# Patient Record
Sex: FEMALE | Race: WHITE | NOT HISPANIC OR LATINO | Employment: FULL TIME | URBAN - METROPOLITAN AREA
[De-identification: names, ages, dates, MRNs, and addresses within clinical notes are randomized per-mention and may not be internally consistent; named-entity substitution may affect disease eponyms.]

---

## 2017-07-25 DIAGNOSIS — Z13.220 LIPID SCREENING: Primary | ICD-10-CM

## 2017-07-25 DIAGNOSIS — Z13.21 ENCOUNTER FOR VITAMIN DEFICIENCY SCREENING: ICD-10-CM

## 2017-07-25 DIAGNOSIS — R63.5 ABNORMAL WEIGHT GAIN: ICD-10-CM

## 2017-07-28 ENCOUNTER — OFFICE VISIT (OUTPATIENT)
Dept: FAMILY MEDICINE CLINIC | Facility: CLINIC | Age: 32
End: 2017-07-28

## 2017-07-28 VITALS
OXYGEN SATURATION: 98 % | WEIGHT: 258 LBS | HEIGHT: 63 IN | BODY MASS INDEX: 45.71 KG/M2 | RESPIRATION RATE: 16 BRPM | TEMPERATURE: 98.6 F | DIASTOLIC BLOOD PRESSURE: 60 MMHG | HEART RATE: 72 BPM | SYSTOLIC BLOOD PRESSURE: 110 MMHG

## 2017-07-28 DIAGNOSIS — Z79.899 HIGH RISK MEDICATION USE: ICD-10-CM

## 2017-07-28 DIAGNOSIS — E66.01 MORBID OBESITY DUE TO EXCESS CALORIES (HCC): Primary | ICD-10-CM

## 2017-07-28 DIAGNOSIS — M54.50 ACUTE BILATERAL LOW BACK PAIN WITHOUT SCIATICA: ICD-10-CM

## 2017-07-28 PROBLEM — F41.9 ANXIETY: Status: ACTIVE | Noted: 2017-07-28

## 2017-07-28 PROCEDURE — 99203 OFFICE O/P NEW LOW 30 MIN: CPT | Performed by: NURSE PRACTITIONER

## 2017-07-28 PROCEDURE — 93000 ELECTROCARDIOGRAM COMPLETE: CPT | Performed by: NURSE PRACTITIONER

## 2017-07-28 RX ORDER — PHENTERMINE HYDROCHLORIDE 37.5 MG/1
37.5 CAPSULE ORAL EVERY MORNING
Qty: 30 CAPSULE | Refills: 0 | Status: SHIPPED | OUTPATIENT
Start: 2017-07-28 | End: 2017-11-20 | Stop reason: ALTCHOICE

## 2017-07-28 RX ORDER — CITALOPRAM 40 MG/1
40 TABLET ORAL DAILY
COMMUNITY
End: 2017-07-28 | Stop reason: ALTCHOICE

## 2017-07-28 RX ORDER — METHYLPREDNISOLONE 4 MG/1
TABLET ORAL
Qty: 21 TABLET | Refills: 0 | Status: SHIPPED | OUTPATIENT
Start: 2017-07-28 | End: 2017-08-17 | Stop reason: SDUPTHER

## 2017-07-28 RX ORDER — METAXALONE 800 MG/1
800 TABLET ORAL 3 TIMES DAILY PRN
Qty: 30 TABLET | Refills: 1 | Status: SHIPPED | OUTPATIENT
Start: 2017-07-28 | End: 2017-11-20

## 2017-07-28 RX ORDER — CITALOPRAM 40 MG/1
40 TABLET ORAL DAILY
Qty: 30 TABLET | Refills: 1 | Status: SHIPPED | OUTPATIENT
Start: 2017-07-28 | End: 2017-11-13 | Stop reason: SDUPTHER

## 2017-07-28 NOTE — PROGRESS NOTES
Subjective   Shu Manrique is a 31 y.o. female. Patient is here today for   Chief Complaint   Patient presents with   • Establish Care     np    • Weight Loss     would like to discuss losing weight          Vitals:    07/28/17 1108   BP: 110/60   Pulse: 72   Resp: 16   Temp: 98.6 °F (37 °C)   SpO2: 98%     Patient's last menstrual period was 07/10/2017.    The following portions of the patient's history were reviewed and updated as appropriate: allergies, current medications, past family history, past medical history, past social history, past surgical history and problem list.    Past Medical History:   Diagnosis Date   • Anxiety       Allergies   Allergen Reactions   • Penicillins       Social History     Social History   • Marital status: Single     Spouse name: N/A   • Number of children: N/A   • Years of education: N/A     Occupational History   • Not on file.     Social History Main Topics   • Smoking status: Never Smoker   • Smokeless tobacco: Never Used   • Alcohol use Yes   • Drug use: Not on file   • Sexual activity: Not on file     Other Topics Concern   • Not on file     Social History Narrative   • No narrative on file        Current Outpatient Prescriptions:   •  Acetaminophen (TYLENOL 8 HOUR PO), Take  by mouth As Needed., Disp: , Rfl:   •  citalopram (CeleXA) 40 MG tablet, Take 1 tablet by mouth Daily., Disp: 30 tablet, Rfl: 1  •  IBUPROFEN PO, Take  by mouth As Needed., Disp: , Rfl:   •  metaxalone (SKELAXIN) 800 MG tablet, Take 1 tablet by mouth 3 (Three) Times a Day As Needed for Muscle Spasms., Disp: 30 tablet, Rfl: 1  •  MethylPREDNISolone (MEDROL, BALBINA,) 4 MG tablet, Take as directed on package instructions., Disp: 21 tablet, Rfl: 0  •  phentermine 37.5 MG capsule, Take 1 capsule by mouth Every Morning., Disp: 30 capsule, Rfl: 0     Objective     History of Present Illness  Shu is here to establish care. She would like to discuss medication for weight loss. She is currently in school and has  not been exercising but has an exercise plan and diet plan.   She also c/o low back pain for a week or so. She denies any injury. She reports she saw a chiropractor a few years ago and was told there was a missing disc. She has not been back. She has had associated muscle spasms. She denies weakness or numbness and tingling. It does not radiate. She has taken tylenol and ibuprofen with minimal relief.   She also has a history of anxiety which has been controlled on celexa.   Review of Systems   Constitutional: Positive for fatigue. Negative for appetite change and fever.   HENT: Negative.    Eyes: Negative.    Respiratory: Negative for cough, shortness of breath and wheezing.    Cardiovascular: Negative.    Endocrine: Negative.    Allergic/Immunologic: Positive for environmental allergies.   Neurological: Negative for weakness and numbness.   Psychiatric/Behavioral: The patient is nervous/anxious (controlled on celexa ).        Physical Exam   Constitutional: Vital signs are normal. She appears well-developed. No distress.   HENT:   Mouth/Throat: Mucous membranes are normal.   Cardiovascular: Normal rate, regular rhythm and normal heart sounds.    Pulmonary/Chest: Effort normal.   Musculoskeletal:        Lumbar back: She exhibits decreased range of motion, tenderness and spasm. She exhibits no bony tenderness, no swelling, no edema and no deformity.   Neurological: She is alert. Gait (slow gait ) normal.   Skin: Skin is warm and dry. No rash noted.         ECG 12 Lead  Date/Time: 7/28/2017 11:28 AM  Performed by: VIKTOR AMATO  Authorized by: VIKTOR AMATO   Comparison: not compared with previous ECG   Previous ECG: no previous ECG available  Rhythm: sinus rhythm  Rate: normal  ST Segments: ST segments normal  T Waves: T waves normal  QRS axis: normal  Other: no other findings  Clinical impression: normal ECG            ASSESSMENT     Problem List Items Addressed This Visit     None      Visit Diagnoses      Morbid obesity due to excess calories    -  Primary    Acute bilateral low back pain without sciatica        Relevant Orders    XR Spine Lumbar 4+ View    High risk medication use        Relevant Orders    ECG 12 Lead          PLAN    Will call with lab results  Start phentermine, discussed potential side effects  Diet and regular exercise  Will check an xray of the spine  Start medrol, skelaxin as needed  Suggest low back exercises  Follow up in one month for a recheck or sooner if needed  Would like to switch from celexa to another med  Due to side effects of decreased libido. 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

## 2017-07-29 LAB
25(OH)D3+25(OH)D2 SERPL-MCNC: 29.1 NG/ML (ref 30–100)
ALBUMIN SERPL-MCNC: 4.7 G/DL (ref 3.5–5.5)
ALBUMIN/GLOB SERPL: 1.5 {RATIO} (ref 1.2–2.2)
ALP SERPL-CCNC: 60 IU/L (ref 39–117)
ALT SERPL-CCNC: 15 IU/L (ref 0–32)
AST SERPL-CCNC: 16 IU/L (ref 0–40)
BASOPHILS # BLD AUTO: 0.1 X10E3/UL (ref 0–0.2)
BASOPHILS NFR BLD AUTO: 1 %
BILIRUB SERPL-MCNC: 1 MG/DL (ref 0–1.2)
BUN SERPL-MCNC: 15 MG/DL (ref 6–20)
BUN/CREAT SERPL: 16 (ref 9–23)
CALCIUM SERPL-MCNC: 9.7 MG/DL (ref 8.7–10.2)
CHLORIDE SERPL-SCNC: 99 MMOL/L (ref 96–106)
CHOLEST SERPL-MCNC: 195 MG/DL (ref 100–199)
CO2 SERPL-SCNC: 22 MMOL/L (ref 18–29)
CREAT SERPL-MCNC: 0.93 MG/DL (ref 0.57–1)
EOSINOPHIL # BLD AUTO: 0.1 X10E3/UL (ref 0–0.4)
EOSINOPHIL NFR BLD AUTO: 1 %
ERYTHROCYTE [DISTWIDTH] IN BLOOD BY AUTOMATED COUNT: 13.7 % (ref 12.3–15.4)
GLOBULIN SER CALC-MCNC: 3.1 G/DL (ref 1.5–4.5)
GLUCOSE SERPL-MCNC: 100 MG/DL (ref 65–99)
GLUCOSE UR QL: (no result)
HCT VFR BLD AUTO: 43.2 % (ref 34–46.6)
HDLC SERPL-MCNC: 70 MG/DL
HGB BLD-MCNC: 14.4 G/DL (ref 11.1–15.9)
IMM GRANULOCYTES # BLD: 0 X10E3/UL (ref 0–0.1)
IMM GRANULOCYTES NFR BLD: 0 %
KETONES UR QL STRIP: (no result)
LDLC SERPL CALC-MCNC: 83 MG/DL (ref 0–99)
LDLC/HDLC SERPL: 1.2 RATIO UNITS (ref 0–3.2)
LYMPHOCYTES # BLD AUTO: 2.9 X10E3/UL (ref 0.7–3.1)
LYMPHOCYTES NFR BLD AUTO: 30 %
MCH RBC QN AUTO: 28.5 PG (ref 26.6–33)
MCHC RBC AUTO-ENTMCNC: 33.3 G/DL (ref 31.5–35.7)
MCV RBC AUTO: 86 FL (ref 79–97)
MONOCYTES # BLD AUTO: 0.4 X10E3/UL (ref 0.1–0.9)
MONOCYTES NFR BLD AUTO: 4 %
NEUTROPHILS # BLD AUTO: 6.3 X10E3/UL (ref 1.4–7)
NEUTROPHILS NFR BLD AUTO: 64 %
PH UR STRIP: (no result) [PH]
PLATELET # BLD AUTO: 289 X10E3/UL (ref 150–379)
POTASSIUM SERPL-SCNC: 4.7 MMOL/L (ref 3.5–5.2)
PROT SERPL-MCNC: 7.8 G/DL (ref 6–8.5)
PROT UR QL STRIP: (no result)
RBC # BLD AUTO: 5.05 X10E6/UL (ref 3.77–5.28)
SODIUM SERPL-SCNC: 141 MMOL/L (ref 134–144)
SP GR UR: (no result)
SPECIMEN STATUS: NORMAL
T4 FREE SERPL-MCNC: 1.27 NG/DL (ref 0.82–1.77)
TRIGL SERPL-MCNC: 209 MG/DL (ref 0–149)
TSH SERPL DL<=0.005 MIU/L-ACNC: 4.73 UIU/ML (ref 0.45–4.5)
VLDLC SERPL CALC-MCNC: 42 MG/DL (ref 5–40)
WBC # BLD AUTO: 9.9 X10E3/UL (ref 3.4–10.8)

## 2017-07-31 ENCOUNTER — TELEPHONE (OUTPATIENT)
Dept: FAMILY MEDICINE CLINIC | Facility: CLINIC | Age: 32
End: 2017-07-31

## 2017-07-31 RX ORDER — LEVOTHYROXINE SODIUM 25 MCG
25 TABLET ORAL DAILY
Qty: 30 TABLET | Refills: 3 | Status: SHIPPED | OUTPATIENT
Start: 2017-07-31 | End: 2017-11-20 | Stop reason: SDUPTHER

## 2017-07-31 NOTE — TELEPHONE ENCOUNTER
Discussed results with the patient  Her tsh is elevated at 4.7. She has been losing her hair, has had weight gain and dry skin  Will start synthroid 25mcg daily and recheck tsh in 8 weeks   Her vit d was also slightly low at 29 so I suggest that she she take 1000 iu daily  Fasting glucose is also slightly elevated at 100

## 2017-08-17 ENCOUNTER — HOSPITAL ENCOUNTER (OUTPATIENT)
Dept: GENERAL RADIOLOGY | Facility: HOSPITAL | Age: 32
Discharge: HOME | End: 2017-08-17
Admitting: NURSE PRACTITIONER

## 2017-08-17 PROCEDURE — 72110 X-RAY EXAM L-2 SPINE 4/>VWS: CPT

## 2017-08-17 RX ORDER — METHYLPREDNISOLONE 4 MG/1
TABLET ORAL
Qty: 21 TABLET | Refills: 0 | Status: SHIPPED | OUTPATIENT
Start: 2017-08-17 | End: 2017-11-20

## 2017-11-13 DIAGNOSIS — R63.5 ABNORMAL WEIGHT GAIN: Primary | ICD-10-CM

## 2017-11-13 RX ORDER — CITALOPRAM 40 MG/1
40 TABLET ORAL DAILY
Qty: 30 TABLET | Refills: 0 | Status: SHIPPED | OUTPATIENT
Start: 2017-11-13 | End: 2017-11-20 | Stop reason: ALTCHOICE

## 2017-11-14 LAB — TSH SERPL DL<=0.005 MIU/L-ACNC: 1.72 MIU/ML (ref 0.27–4.2)

## 2017-11-20 ENCOUNTER — OFFICE VISIT (OUTPATIENT)
Dept: FAMILY MEDICINE CLINIC | Facility: CLINIC | Age: 32
End: 2017-11-20

## 2017-11-20 VITALS
DIASTOLIC BLOOD PRESSURE: 90 MMHG | RESPIRATION RATE: 16 BRPM | HEIGHT: 63 IN | SYSTOLIC BLOOD PRESSURE: 114 MMHG | BODY MASS INDEX: 47.48 KG/M2 | HEART RATE: 81 BPM | TEMPERATURE: 98.8 F | WEIGHT: 268 LBS | OXYGEN SATURATION: 98 %

## 2017-11-20 DIAGNOSIS — E03.9 HYPOTHYROIDISM, UNSPECIFIED TYPE: ICD-10-CM

## 2017-11-20 DIAGNOSIS — E66.01 MORBID OBESITY DUE TO EXCESS CALORIES (HCC): ICD-10-CM

## 2017-11-20 DIAGNOSIS — F41.9 ANXIETY: Primary | ICD-10-CM

## 2017-11-20 DIAGNOSIS — R63.5 ABNORMAL WEIGHT GAIN: ICD-10-CM

## 2017-11-20 PROCEDURE — 99214 OFFICE O/P EST MOD 30 MIN: CPT | Performed by: NURSE PRACTITIONER

## 2017-11-20 RX ORDER — FLUOXETINE HYDROCHLORIDE 20 MG/1
20 CAPSULE ORAL DAILY
Qty: 90 CAPSULE | Refills: 3 | Status: SHIPPED | OUTPATIENT
Start: 2017-11-20 | End: 2018-01-26 | Stop reason: DRUGHIGH

## 2017-11-20 RX ORDER — LEVOTHYROXINE SODIUM 25 MCG
25 TABLET ORAL DAILY
Qty: 30 TABLET | Refills: 6 | Status: SHIPPED | OUTPATIENT
Start: 2017-11-20 | End: 2019-04-08 | Stop reason: SDUPTHER

## 2017-11-20 NOTE — PROGRESS NOTES
Subjective   Shu Manrique is a 32 y.o. female. Patient is here today for   Chief Complaint   Patient presents with   • Follow-up     labs    • Weight Loss     refill phentermine           Vitals:    11/20/17 0917   BP: 114/90   Pulse: 81   Resp: 16   Temp: 98.8 °F (37.1 °C)   SpO2: 98%     The following portions of the patient's history were reviewed and updated as appropriate: allergies, current medications, past family history, past medical history, past social history, past surgical history and problem list.    Past Medical History:   Diagnosis Date   • Anxiety       Allergies   Allergen Reactions   • Penicillins       Social History     Social History   • Marital status: Single     Spouse name: N/A   • Number of children: N/A   • Years of education: N/A     Occupational History   • Not on file.     Social History Main Topics   • Smoking status: Never Smoker   • Smokeless tobacco: Never Used   • Alcohol use Yes   • Drug use: Not on file   • Sexual activity: Not on file     Other Topics Concern   • Not on file     Social History Narrative        Current Outpatient Prescriptions:   •  Acetaminophen (TYLENOL 8 HOUR PO), Take  by mouth As Needed., Disp: , Rfl:   •  FLUoxetine (PROzac) 20 MG capsule, Take 1 capsule by mouth Daily., Disp: 90 capsule, Rfl: 3  •  Phentermine-Topiramate 3.75-23 MG capsule sustained-release 24 hr, Take 1 capsule by mouth Daily., Disp: 30 capsule, Rfl: 0  •  SYNTHROID 25 MCG tablet, Take 1 tablet by mouth Daily., Disp: 30 tablet, Rfl: 6     Objective     History of Present Illness  Shu is here for a follow up for hypothyrodism, anxiety, weight gain. Labs were done and her tsh was elevated. She had dry skin, fatigue, and abnormal weight gain. She was started on synthroid 3 months ago and her tsh was slightly elevated at 4.7. Due to her symptoms she was started on synthroid. She was also given phentermine for weight loss. She reports she was done with 246 but has gained weight back since.    She has a history of anxiety and has been on celexa. She is compliant with her medication but would like to switch try fluoxetine. 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   She is sleeping well and denies any signs of depression.   She would like to go back on something for weight loss.     Review of Systems   Constitutional: Positive for fatigue and unexpected weight change. Negative for chills and fever.   HENT: Negative.    Respiratory: Negative for cough, chest tightness and shortness of breath.    Cardiovascular: Negative for chest pain, palpitations and leg swelling.   Gastrointestinal: Negative.    Genitourinary: Negative.    Musculoskeletal: Positive for arthralgias.   Skin:        Dry flakey skin especially in the scalp    Neurological: Negative.    Psychiatric/Behavioral: Negative for dysphoric mood and sleep disturbance. The patient is nervous/anxious.        Physical Exam   Constitutional: She is oriented to person, place, and time. Vital signs are normal. She appears well-developed and well-nourished. No distress.   HENT:   Mouth/Throat: Mucous membranes are normal.   Cardiovascular: Normal rate, regular rhythm and normal heart sounds.    Pulmonary/Chest: Effort normal and breath sounds normal.   Neurological: She is alert and oriented to person, place, and time.   Skin: Skin is warm, dry and intact.   Dry skin noted    Psychiatric: She has a normal mood and affect. Her speech is normal and behavior is normal. Cognition and memory are normal.       ASSESSMENT     Problem List Items Addressed This Visit     Anxiety - Primary      Other Visit Diagnoses     Abnormal weight gain        Morbid obesity due to excess calories        Hypothyroidism, unspecified type        Relevant Medications    SYNTHROID 25 MCG tablet          PLAN    1. Hypothyrodism (subclinical) continue synthroid, tsh is 1.7,   Discussed referral to endo because she is still having symptoms of fatigue,  weight gain, and dry skin and she declined  2. Weight gain- will start qsymia for weight loss, diet and exercise daily  chrissy reviewed and is appropriate  3. Anxiety- taper off celexa over the next 1-2 weeks and start fluoxetine   Follow up in one month for a weight check  Follow up in 6 months with tsh and free t4

## 2017-12-04 ENCOUNTER — TELEPHONE (OUTPATIENT)
Dept: FAMILY MEDICINE CLINIC | Facility: CLINIC | Age: 32
End: 2017-12-04

## 2018-01-26 ENCOUNTER — TELEPHONE (OUTPATIENT)
Dept: FAMILY MEDICINE CLINIC | Facility: CLINIC | Age: 33
End: 2018-01-26

## 2018-01-26 RX ORDER — FLUOXETINE HYDROCHLORIDE 40 MG/1
40 CAPSULE ORAL DAILY
Qty: 90 CAPSULE | Refills: 3 | Status: SHIPPED | OUTPATIENT
Start: 2018-01-26 | End: 2019-04-08

## 2018-01-26 NOTE — TELEPHONE ENCOUNTER
Pt called. She increased her dose of fluoxetine to 40mg and needs a new rx sent over. She ran out early  Will send over a new rx

## 2018-01-29 ENCOUNTER — OFFICE VISIT (OUTPATIENT)
Dept: FAMILY MEDICINE CLINIC | Facility: CLINIC | Age: 33
End: 2018-01-29

## 2018-01-29 VITALS
HEIGHT: 63 IN | HEART RATE: 85 BPM | BODY MASS INDEX: 48.2 KG/M2 | OXYGEN SATURATION: 98 % | WEIGHT: 272 LBS | TEMPERATURE: 98.5 F | DIASTOLIC BLOOD PRESSURE: 88 MMHG | SYSTOLIC BLOOD PRESSURE: 110 MMHG

## 2018-01-29 DIAGNOSIS — M54.50 ACUTE BILATERAL LOW BACK PAIN WITHOUT SCIATICA: Primary | ICD-10-CM

## 2018-01-29 PROCEDURE — 99213 OFFICE O/P EST LOW 20 MIN: CPT | Performed by: NURSE PRACTITIONER

## 2018-01-29 RX ORDER — METHYLPREDNISOLONE 4 MG/1
TABLET ORAL
Qty: 21 TABLET | Refills: 2 | Status: SHIPPED | OUTPATIENT
Start: 2018-01-29 | End: 2018-11-28

## 2018-01-29 RX ORDER — METHYLPREDNISOLONE 4 MG/1
TABLET ORAL
Qty: 21 TABLET | Refills: 2 | Status: SHIPPED | OUTPATIENT
Start: 2018-01-29 | End: 2018-01-29 | Stop reason: SDUPTHER

## 2018-01-29 NOTE — PROGRESS NOTES
Subjective   Shu Manrique is a 32 y.o. female.   Chief Complaint   Patient presents with   • Back Pain     Vitals:    01/29/18 1347   BP: 110/88   Pulse: 85   Temp: 98.5 °F (36.9 °C)   SpO2: 98%     No LMP recorded.    History of Present Illness  Shu is here for an acute visit. She c/o low back pain for a few days. She has had back pain on and off in the past and has seen a chiropractor and was told she was missing a disc. The pain is moderate, does not radiate. She had xrays in July which showed that her L3 and L4 were fused. An MRI was recommended but she wants to wait. She denies difficulty walking, or numbness and tingling. She has had some associated muscle spasms and has an rx for skelaxin at home.     The following portions of the patient's history were reviewed and updated as appropriate: allergies, current medications, past family history, past medical history, past social history, past surgical history and problem list.    Review of Systems   Constitutional: Negative for chills, fatigue and fever.   Respiratory: Negative.    Cardiovascular: Negative.    Musculoskeletal: Positive for back pain.   Neurological: Negative for weakness.       Objective   Physical Exam   Constitutional: Vital signs are normal. She appears well-developed and well-nourished.   Cardiovascular: Normal rate.    Pulmonary/Chest: Effort normal.   Musculoskeletal:        Lumbar back: She exhibits tenderness (at l3-4 ). She exhibits normal range of motion, no swelling and no edema.   Neurological: She is alert. Gait normal.       Assessment/Plan   Shu was seen today for back pain.    Diagnoses and all orders for this visit:    Acute bilateral low back pain without sciatica    Other orders  -     Discontinue: MethylPREDNISolone (MEDROL, BALBINA,) 4 MG tablet; Take as directed on package instructions.  -     MethylPREDNISolone (MEDROL, BALBINA,) 4 MG tablet; Take as directed on package instructions.      Start medrol  Low back  exercise  Declined MRI and PT  Follow up as needed and for continual care

## 2018-11-08 ENCOUNTER — OFFICE VISIT (OUTPATIENT)
Dept: RETAIL CLINIC | Facility: CLINIC | Age: 33
End: 2018-11-08

## 2018-11-08 DIAGNOSIS — Z23 NEED FOR VACCINATION: Primary | ICD-10-CM

## 2018-11-28 ENCOUNTER — OFFICE VISIT (OUTPATIENT)
Dept: RETAIL CLINIC | Facility: CLINIC | Age: 33
End: 2018-11-28

## 2018-11-28 VITALS
OXYGEN SATURATION: 98 % | DIASTOLIC BLOOD PRESSURE: 86 MMHG | HEART RATE: 95 BPM | RESPIRATION RATE: 17 BRPM | SYSTOLIC BLOOD PRESSURE: 124 MMHG | TEMPERATURE: 99.2 F

## 2018-11-28 DIAGNOSIS — J01.00 ACUTE NON-RECURRENT MAXILLARY SINUSITIS: ICD-10-CM

## 2018-11-28 DIAGNOSIS — R05.9 COUGH: Primary | ICD-10-CM

## 2018-11-28 PROCEDURE — 99213 OFFICE O/P EST LOW 20 MIN: CPT | Performed by: NURSE PRACTITIONER

## 2018-11-28 RX ORDER — METHYLPREDNISOLONE 4 MG/1
TABLET ORAL
Qty: 1 EACH | Refills: 0 | Status: SHIPPED | OUTPATIENT
Start: 2018-11-28 | End: 2018-12-31

## 2018-11-28 RX ORDER — CEFDINIR 300 MG/1
300 CAPSULE ORAL 2 TIMES DAILY
Qty: 14 CAPSULE | Refills: 0 | Status: SHIPPED | OUTPATIENT
Start: 2018-11-28 | End: 2018-12-05

## 2018-11-28 RX ORDER — DEXTROMETHORPHAN HYDROBROMIDE AND PROMETHAZINE HYDROCHLORIDE 15; 6.25 MG/5ML; MG/5ML
5 SYRUP ORAL 2 TIMES DAILY
Qty: 100 ML | Refills: 0 | Status: SHIPPED | OUTPATIENT
Start: 2018-11-28 | End: 2018-12-08

## 2018-11-28 RX ORDER — ALBUTEROL SULFATE 90 UG/1
2 AEROSOL, METERED RESPIRATORY (INHALATION) EVERY 4 HOURS PRN
Qty: 1 INHALER | Refills: 0 | Status: SHIPPED | OUTPATIENT
Start: 2018-11-28 | End: 2019-04-08

## 2018-11-28 NOTE — PATIENT INSTRUCTIONS

## 2018-11-29 NOTE — PROGRESS NOTES
Subjective   Shu Manrique is a 33 y.o. female.     Cough   This is a chronic problem. The current episode started more than 1 month ago. The problem has been unchanged. The problem occurs every few minutes. The cough is productive of sputum (very small amount ). Associated symptoms include ear congestion, headaches, nasal congestion, postnasal drip, rhinorrhea and shortness of breath. Pertinent negatives include no chest pain, chills, ear pain, eye redness, fever, myalgias, sore throat or wheezing. The symptoms are aggravated by lying down and exercise. Treatments tried: mucinex. The treatment provided mild relief. There is no history of asthma, environmental allergies or pneumonia.       The following portions of the patient's history were reviewed and updated as appropriate: allergies, current medications, past medical history, past social history, past surgical history and problem list.    Review of Systems   Constitutional: Negative for appetite change, chills, diaphoresis, fatigue and fever.   HENT: Positive for congestion, dental problem (maxillary gum/jaw pain/pressure), postnasal drip, rhinorrhea, sinus pressure and sinus pain. Negative for ear discharge, ear pain, hearing loss, mouth sores, nosebleeds, sneezing, sore throat, tinnitus, trouble swallowing and voice change.    Eyes: Negative for redness and itching.   Respiratory: Positive for cough, chest tightness and shortness of breath. Negative for choking, wheezing and stridor.    Cardiovascular: Negative for chest pain.   Gastrointestinal: Negative for diarrhea, nausea and vomiting.   Musculoskeletal: Negative for myalgias and neck stiffness.   Allergic/Immunologic: Negative for environmental allergies and immunocompromised state.   Neurological: Positive for headaches. Negative for dizziness and syncope.       Objective   Physical Exam   Constitutional: She is oriented to person, place, and time. She appears well-developed and well-nourished. She is  cooperative.  Non-toxic appearance. She does not appear ill. No distress.   HENT:   Right Ear: External ear and ear canal normal. Tympanic membrane is not perforated, not erythematous, not retracted and not bulging. A middle ear effusion is present.   Left Ear: External ear and ear canal normal. Tympanic membrane is not perforated, not erythematous, not retracted and not bulging. A middle ear effusion is present.   Nose: Mucosal edema present. No rhinorrhea. Right sinus exhibits maxillary sinus tenderness. Right sinus exhibits no frontal sinus tenderness. Left sinus exhibits maxillary sinus tenderness. Left sinus exhibits no frontal sinus tenderness.   Mouth/Throat: Uvula is midline and mucous membranes are normal. Posterior oropharyngeal erythema present. No oropharyngeal exudate or posterior oropharyngeal edema. Tonsils are 0 on the right. Tonsils are 0 on the left.   Eyes: Conjunctivae and lids are normal.   Cardiovascular: Normal rate, regular rhythm, S1 normal and S2 normal.   Pulmonary/Chest: Effort normal and breath sounds normal.   Lymphadenopathy:     She has no cervical adenopathy.   Neurological: She is alert and oriented to person, place, and time.   Skin: Skin is warm and dry. She is not diaphoretic. No pallor.   Vitals reviewed.      Assessment/Plan   Shu was seen today for cough and sinus problem.    Diagnoses and all orders for this visit:    Cough  -     albuterol (PROVENTIL HFA;VENTOLIN HFA;PROAIR HFA) 108 (90 Base) MCG/ACT inhaler; Inhale 2 puffs Every 4 (Four) Hours As Needed for Wheezing or Shortness of Air.  -     promethazine-dextromethorphan (PROMETHAZINE-DM) 6.25-15 MG/5ML syrup; Take 5 mL by mouth 2 (Two) Times a Day for 10 days.    Acute non-recurrent maxillary sinusitis  -     MethylPREDNISolone (MEDROL, BALBINA,) 4 MG tablet; Take as directed on package instructions.  -     cefdinir (OMNICEF) 300 MG capsule; Take 1 capsule by mouth 2 (Two) Times a Day for 7 days.          -     May  continue with mucinex for congestion relief-increase H2O intake with use        -     Follow up with PCP for persistent symptoms or UC/ER for worsening symptoms

## 2018-12-31 ENCOUNTER — OFFICE VISIT (OUTPATIENT)
Dept: FAMILY MEDICINE CLINIC | Facility: CLINIC | Age: 33
End: 2018-12-31

## 2018-12-31 VITALS
BODY MASS INDEX: 49.4 KG/M2 | WEIGHT: 278.8 LBS | SYSTOLIC BLOOD PRESSURE: 112 MMHG | TEMPERATURE: 98.3 F | DIASTOLIC BLOOD PRESSURE: 68 MMHG | HEART RATE: 85 BPM | RESPIRATION RATE: 16 BRPM | OXYGEN SATURATION: 98 % | HEIGHT: 63 IN

## 2018-12-31 DIAGNOSIS — M54.50 ACUTE BILATERAL LOW BACK PAIN WITHOUT SCIATICA: Primary | ICD-10-CM

## 2018-12-31 DIAGNOSIS — E03.9 HYPOTHYROIDISM, UNSPECIFIED TYPE: Primary | ICD-10-CM

## 2018-12-31 DIAGNOSIS — Z79.899 LONG TERM USE OF DRUG: ICD-10-CM

## 2018-12-31 PROCEDURE — 99213 OFFICE O/P EST LOW 20 MIN: CPT | Performed by: NURSE PRACTITIONER

## 2018-12-31 RX ORDER — METHYLPREDNISOLONE 4 MG/1
TABLET ORAL
Qty: 21 TABLET | Refills: 0 | Status: SHIPPED | OUTPATIENT
Start: 2018-12-31 | End: 2019-04-08

## 2018-12-31 NOTE — PROGRESS NOTES
Rob Manrique is a 33 y.o. female. Patient is here today for   Chief Complaint   Patient presents with   • Back Pain     has been going on for a day bent down to  something and hurt back          Vitals:    12/31/18 1140   BP: 112/68   Pulse: 85   Resp: 16   Temp: 98.3 °F (36.8 °C)   SpO2: 98%     The following portions of the patient's history were reviewed and updated as appropriate: allergies, current medications, past family history, past medical history, past social history, past surgical history and problem list.    Past Medical History:   Diagnosis Date   • Anxiety       Allergies   Allergen Reactions   • Penicillins Rash      Social History     Socioeconomic History   • Marital status: Single     Spouse name: Not on file   • Number of children: Not on file   • Years of education: Not on file   • Highest education level: Not on file   Social Needs   • Financial resource strain: Not on file   • Food insecurity - worry: Not on file   • Food insecurity - inability: Not on file   • Transportation needs - medical: Not on file   • Transportation needs - non-medical: Not on file   Occupational History   • Not on file   Tobacco Use   • Smoking status: Never Smoker   • Smokeless tobacco: Never Used   Substance and Sexual Activity   • Alcohol use: Yes     Comment: socially    • Drug use: Defer   • Sexual activity: Defer   Other Topics Concern   • Not on file   Social History Narrative   • Not on file        Current Outpatient Medications:   •  FLUoxetine (PROZAC) 40 MG capsule, Take 1 capsule by mouth Daily., Disp: 90 capsule, Rfl: 3  •  SYNTHROID 25 MCG tablet, Take 1 tablet by mouth Daily., Disp: 30 tablet, Rfl: 6  •  albuterol (PROVENTIL HFA;VENTOLIN HFA;PROAIR HFA) 108 (90 Base) MCG/ACT inhaler, Inhale 2 puffs Every 4 (Four) Hours As Needed for Wheezing or Shortness of Air., Disp: 1 inhaler, Rfl: 0  •  MethylPREDNISolone (MEDROL, BALBINA,) 4 MG tablet, Take as directed on package instructions., Disp:  21 tablet, Rfl: 0     Objective     History of Present Illness Shu is here for an acute visit. She c/o low back pain for the last few days. It started after bending over and picking something up. It is bilateral and does not radiate. She has had some pain with sitting and walking. She has taken medrol dosepak in the past which has helped.     Review of Systems   Constitutional: Negative for chills, fatigue and fever.   Respiratory: Negative.    Cardiovascular: Negative.    Musculoskeletal: Positive for back pain.       Physical Exam   Constitutional: Vital signs are normal. She appears well-developed and well-nourished. She does not appear ill. No distress.   Cardiovascular: Normal rate.   Pulmonary/Chest: Effort normal and breath sounds normal.   Neurological: She is alert.   Skin: Skin is warm, dry and intact.   Psychiatric: She has a normal mood and affect.       ASSESSMENT     Problem List Items Addressed This Visit     None      Visit Diagnoses     Acute bilateral low back pain without sciatica    -  Primary          PLAN  No lifting more than 10 lbs,   Low back exercises  Follow up if symptoms persist, worsen or if new symptoms develop   Will schedule labs and an appt for med refills     Return for schedule labs and a follow up fasting.

## 2019-02-13 ENCOUNTER — APPOINTMENT (OUTPATIENT)
Dept: WOMENS IMAGING | Facility: HOSPITAL | Age: 34
End: 2019-02-13

## 2019-02-13 PROCEDURE — 77066 DX MAMMO INCL CAD BI: CPT | Performed by: RADIOLOGY

## 2019-02-13 PROCEDURE — 76641 ULTRASOUND BREAST COMPLETE: CPT | Performed by: RADIOLOGY

## 2019-02-13 PROCEDURE — 77062 BREAST TOMOSYNTHESIS BI: CPT | Performed by: RADIOLOGY

## 2019-02-13 PROCEDURE — G0279 TOMOSYNTHESIS, MAMMO: HCPCS | Performed by: RADIOLOGY

## 2019-02-25 ENCOUNTER — OFFICE VISIT (OUTPATIENT)
Dept: RETAIL CLINIC | Facility: CLINIC | Age: 34
End: 2019-02-25

## 2019-02-25 VITALS
DIASTOLIC BLOOD PRESSURE: 72 MMHG | RESPIRATION RATE: 18 BRPM | HEART RATE: 108 BPM | SYSTOLIC BLOOD PRESSURE: 122 MMHG | TEMPERATURE: 98.7 F | OXYGEN SATURATION: 97 %

## 2019-02-25 DIAGNOSIS — J11.1 INFLUENZA: Primary | ICD-10-CM

## 2019-02-25 PROCEDURE — 99213 OFFICE O/P EST LOW 20 MIN: CPT | Performed by: NURSE PRACTITIONER

## 2019-02-25 NOTE — PROGRESS NOTES
Subjective   Shu Manrique is a 33 y.o. female.     Patient received influenza vaccination this season. 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 She is feeling better today but is needing a note to return to work.       Flu Symptoms   This is a new problem. The current episode started in the past 7 days. The problem occurs constantly. The problem has been gradually improving (feeling better since yesterday afternoon). Associated symptoms include anorexia, chills, coughing, fatigue, a fever, headaches, myalgias, nausea and weakness. Pertinent negatives include no abdominal pain, change in bowel habit, chest pain, congestion, diaphoresis, sore throat, swollen glands or vomiting. The symptoms are aggravated by exertion. She has tried rest, lying down and NSAIDs for the symptoms. The treatment provided mild relief.       The following portions of the patient's history were reviewed and updated as appropriate: allergies, current medications, past medical history, past social history, past surgical history and problem list.    Review of Systems   Constitutional: Positive for appetite change (diminished), chills, fatigue and fever. Negative for diaphoresis.   HENT: Positive for ear pain (pressure), postnasal drip and rhinorrhea. Negative for congestion, sinus pressure, sneezing, sore throat and voice change.    Respiratory: Positive for cough. Negative for chest tightness, shortness of breath and wheezing.    Cardiovascular: Negative for chest pain.   Gastrointestinal: Positive for anorexia and nausea. Negative for abdominal pain, change in bowel habit, diarrhea and vomiting.   Genitourinary: Negative for decreased urine volume.   Musculoskeletal: Positive for myalgias.   Allergic/Immunologic: Negative for immunocompromised state.   Neurological: Positive for weakness and headaches. Negative for dizziness.       Objective   Physical Exam   Constitutional: She is oriented to person, place, and  time. She appears well-developed and well-nourished. She is cooperative.  Non-toxic appearance. She does not appear ill. No distress.   HENT:   Right Ear: External ear and ear canal normal. Tympanic membrane is not perforated, not erythematous, not retracted and not bulging. A middle ear effusion is present.   Left Ear: External ear and ear canal normal. Tympanic membrane is not perforated, not erythematous, not retracted and not bulging. A middle ear effusion is present.   Nose: Nose normal.   Mouth/Throat: Uvula is midline and mucous membranes are normal. No oral lesions. No uvula swelling. Posterior oropharyngeal erythema (cobblestoning ) present. No oropharyngeal exudate or posterior oropharyngeal edema. Tonsils are 0 on the right. Tonsils are 0 on the left.   Eyes: Conjunctivae and lids are normal.   Cardiovascular: Normal rate, regular rhythm, S1 normal and S2 normal.   Pulmonary/Chest: Effort normal and breath sounds normal.   Neurological: She is alert and oriented to person, place, and time.   Skin: Skin is warm and dry. She is not diaphoretic. No pallor.   Vitals reviewed.      Assessment/Plan   Shu was seen today for flu symptoms.    Diagnoses and all orders for this visit:    Influenza     - Symptomatic care: rest, increase H2O intake, tylenol/ibuprofen PRN pain/fever relief, BRAT diet: advance as tolerated    - Note given for pt's employer as requested   - F/U with PCP for persistent symptoms or UC/ER for worsening symptoms

## 2019-02-25 NOTE — PATIENT INSTRUCTIONS

## 2019-04-08 ENCOUNTER — OFFICE VISIT (OUTPATIENT)
Dept: FAMILY MEDICINE CLINIC | Facility: CLINIC | Age: 34
End: 2019-04-08

## 2019-04-08 VITALS
BODY MASS INDEX: 49.82 KG/M2 | DIASTOLIC BLOOD PRESSURE: 74 MMHG | TEMPERATURE: 98.3 F | WEIGHT: 281.2 LBS | OXYGEN SATURATION: 99 % | RESPIRATION RATE: 18 BRPM | HEIGHT: 63 IN | SYSTOLIC BLOOD PRESSURE: 118 MMHG | HEART RATE: 83 BPM

## 2019-04-08 DIAGNOSIS — R73.9 HYPERGLYCEMIA: ICD-10-CM

## 2019-04-08 DIAGNOSIS — R23.8 DRY SCALP: ICD-10-CM

## 2019-04-08 DIAGNOSIS — E06.3 HASHIMOTO'S DISEASE: ICD-10-CM

## 2019-04-08 DIAGNOSIS — F41.9 ANXIETY: Primary | ICD-10-CM

## 2019-04-08 DIAGNOSIS — E66.01 MORBID OBESITY DUE TO EXCESS CALORIES (HCC): ICD-10-CM

## 2019-04-08 LAB
ALBUMIN SERPL-MCNC: 4.3 G/DL (ref 3.5–5.2)
ALBUMIN/GLOB SERPL: 1.5 G/DL
ALP SERPL-CCNC: 62 U/L (ref 39–117)
ALT SERPL-CCNC: 16 U/L (ref 1–33)
AST SERPL-CCNC: 12 U/L (ref 1–32)
BASOPHILS # BLD AUTO: 0.06 10*3/MM3 (ref 0–0.2)
BASOPHILS NFR BLD AUTO: 0.8 % (ref 0–1.5)
BILIRUB SERPL-MCNC: 1 MG/DL (ref 0.2–1.2)
BUN SERPL-MCNC: 14 MG/DL (ref 6–20)
BUN/CREAT SERPL: 17.5 (ref 7–25)
CALCIUM SERPL-MCNC: 9.1 MG/DL (ref 8.6–10.5)
CHLORIDE SERPL-SCNC: 100 MMOL/L (ref 98–107)
CO2 SERPL-SCNC: 24.5 MMOL/L (ref 22–29)
CREAT SERPL-MCNC: 0.8 MG/DL (ref 0.57–1)
EOSINOPHIL # BLD AUTO: 0.14 10*3/MM3 (ref 0–0.4)
EOSINOPHIL NFR BLD AUTO: 1.9 % (ref 0.3–6.2)
ERYTHROCYTE [DISTWIDTH] IN BLOOD BY AUTOMATED COUNT: 13.7 % (ref 12.3–15.4)
GLOBULIN SER CALC-MCNC: 2.8 GM/DL
GLUCOSE SERPL-MCNC: 127 MG/DL (ref 65–99)
HCT VFR BLD AUTO: 43 % (ref 34–46.6)
HGB BLD-MCNC: 13.2 G/DL (ref 12–15.9)
IMM GRANULOCYTES # BLD AUTO: 0.03 10*3/MM3 (ref 0–0.05)
IMM GRANULOCYTES NFR BLD AUTO: 0.4 % (ref 0–0.5)
LYMPHOCYTES # BLD AUTO: 1.93 10*3/MM3 (ref 0.7–3.1)
LYMPHOCYTES NFR BLD AUTO: 26.5 % (ref 19.6–45.3)
MCH RBC QN AUTO: 27.5 PG (ref 26.6–33)
MCHC RBC AUTO-ENTMCNC: 30.7 G/DL (ref 31.5–35.7)
MCV RBC AUTO: 89.6 FL (ref 79–97)
MONOCYTES # BLD AUTO: 0.5 10*3/MM3 (ref 0.1–0.9)
MONOCYTES NFR BLD AUTO: 6.9 % (ref 5–12)
NEUTROPHILS # BLD AUTO: 4.61 10*3/MM3 (ref 1.4–7)
NEUTROPHILS NFR BLD AUTO: 63.5 % (ref 42.7–76)
NRBC BLD AUTO-RTO: 0 /100 WBC (ref 0–0)
PLATELET # BLD AUTO: 278 10*3/MM3 (ref 140–450)
POTASSIUM SERPL-SCNC: 4.1 MMOL/L (ref 3.5–5.2)
PROT SERPL-MCNC: 7.1 G/DL (ref 6–8.5)
RBC # BLD AUTO: 4.8 10*6/MM3 (ref 3.77–5.28)
SODIUM SERPL-SCNC: 139 MMOL/L (ref 136–145)
T4 FREE SERPL-MCNC: 1.26 NG/DL (ref 0.93–1.7)
THYROGLOB AB SERPL-ACNC: 301.6 IU/ML (ref 0–0.9)
THYROGLOB SERPL-MCNC: 12 NG/ML
THYROPEROXIDASE AB SERPL-ACNC: 85 IU/ML (ref 0–34)
TSH SERPL DL<=0.005 MIU/L-ACNC: 3.1 MIU/ML (ref 0.27–4.2)
WBC # BLD AUTO: 7.27 10*3/MM3 (ref 3.4–10.8)

## 2019-04-08 PROCEDURE — 99214 OFFICE O/P EST MOD 30 MIN: CPT | Performed by: NURSE PRACTITIONER

## 2019-04-08 RX ORDER — LEVOTHYROXINE SODIUM 25 MCG
25 TABLET ORAL DAILY
Qty: 30 TABLET | Refills: 6 | Status: SHIPPED | OUTPATIENT
Start: 2019-04-08 | End: 2019-07-30 | Stop reason: SDUPTHER

## 2019-04-08 RX ORDER — SELENIUM SULFIDE 2.5 MG/100ML
LOTION TOPICAL DAILY PRN
Qty: 118 ML | Refills: 12 | Status: SHIPPED | OUTPATIENT
Start: 2019-04-08

## 2019-04-08 NOTE — PROGRESS NOTES
Subjective   Shu Manrique is a 33 y.o. female. Patient is here today for   Chief Complaint   Patient presents with   • Hypothyroidism     lab follow up           Vitals:    04/08/19 0813   BP: 118/74   Pulse: 83   Resp: 18   Temp: 98.3 °F (36.8 °C)   SpO2: 99%       The following portions of the patient's history were reviewed and updated as appropriate: allergies, current medications, past family history, past medical history, past social history, past surgical history and problem list.    Past Medical History:   Diagnosis Date   • Anxiety       Allergies   Allergen Reactions   • Penicillins Rash      Social History     Socioeconomic History   • Marital status: Single     Spouse name: Not on file   • Number of children: Not on file   • Years of education: Not on file   • Highest education level: Not on file   Tobacco Use   • Smoking status: Never Smoker   • Smokeless tobacco: Never Used   Substance and Sexual Activity   • Alcohol use: Yes     Comment: socially    • Drug use: Defer   • Sexual activity: Defer        Current Outpatient Medications:   •  selenium sulfide (SELSUN) 2.5 % shampoo, Apply  topically to the appropriate area as directed Daily As Needed for dandruff., Disp: 118 mL, Rfl: 12  •  SYNTHROID 25 MCG tablet, Take 1 tablet by mouth Daily., Disp: 30 tablet, Rfl: 6     Objective   History of Present Illness Shu is here for a lab follow up. She has a history of elevated TSH. She has been off of her synthroid for 3 months because she ran out. She also discontinued her fluoxetine because she felt like it wasn't working anymore.   She has had fatigue, difficulty concentrating, dry peeling skin and scalp,and anxiety. She has been eating a low carb diet for 6 weeks and has eliminated diet soda. She has not been exercising regularly.   I reviewed her labs with her and gave her a copy  No visits with results within 1 Month(s) from this visit.   Latest known visit with results is:   Orders Only on 12/31/2018    Component Date Value Ref Range Status   • WBC 03/29/2019 7.27  3.40 - 10.80 10*3/mm3 Final   • RBC 03/29/2019 4.80  3.77 - 5.28 10*6/mm3 Final   • Hemoglobin 03/29/2019 13.2  12.0 - 15.9 g/dL Final   • Hematocrit 03/29/2019 43.0  34.0 - 46.6 % Final   • MCV 03/29/2019 89.6  79.0 - 97.0 fL Final   • MCH 03/29/2019 27.5  26.6 - 33.0 pg Final   • MCHC 03/29/2019 30.7* 31.5 - 35.7 g/dL Final   • RDW 03/29/2019 13.7  12.3 - 15.4 % Final   • Platelets 03/29/2019 278  140 - 450 10*3/mm3 Final   • Neutrophil Rel % 03/29/2019 63.5  42.7 - 76.0 % Final   • Lymphocyte Rel % 03/29/2019 26.5  19.6 - 45.3 % Final   • Monocyte Rel % 03/29/2019 6.9  5.0 - 12.0 % Final   • Eosinophil Rel % 03/29/2019 1.9  0.3 - 6.2 % Final   • Basophil Rel % 03/29/2019 0.8  0.0 - 1.5 % Final   • Neutrophils Absolute 03/29/2019 4.61  1.40 - 7.00 10*3/mm3 Final   • Lymphocytes Absolute 03/29/2019 1.93  0.70 - 3.10 10*3/mm3 Final   • Monocytes Absolute 03/29/2019 0.50  0.10 - 0.90 10*3/mm3 Final   • Eosinophils Absolute 03/29/2019 0.14  0.00 - 0.40 10*3/mm3 Final   • Basophils Absolute 03/29/2019 0.06  0.00 - 0.20 10*3/mm3 Final   • Immature Granulocyte Rel % 03/29/2019 0.4  0.0 - 0.5 % Final   • Immature Grans Absolute 03/29/2019 0.03  0.00 - 0.05 10*3/mm3 Final   • nRBC 03/29/2019 0.0  0.0 - 0.0 /100 WBC Final   • Glucose 03/29/2019 127* 65 - 99 mg/dL Final   • BUN 03/29/2019 14  6 - 20 mg/dL Final   • Creatinine 03/29/2019 0.80  0.57 - 1.00 mg/dL Final   • eGFR Non African Am 03/29/2019 82  >60 mL/min/1.73 Final   • eGFR African Am 03/29/2019 100  >60 mL/min/1.73 Final   • BUN/Creatinine Ratio 03/29/2019 17.5  7.0 - 25.0 Final   • Sodium 03/29/2019 139  136 - 145 mmol/L Final   • Potassium 03/29/2019 4.1  3.5 - 5.2 mmol/L Final   • Chloride 03/29/2019 100  98 - 107 mmol/L Final   • Total CO2 03/29/2019 24.5  22.0 - 29.0 mmol/L Final   • Calcium 03/29/2019 9.1  8.6 - 10.5 mg/dL Final   • Total Protein 03/29/2019 7.1  6.0 - 8.5 g/dL Final   •  Albumin 03/29/2019 4.30  3.50 - 5.20 g/dL Final   • Globulin 03/29/2019 2.8  gm/dL Final   • A/G Ratio 03/29/2019 1.5  g/dL Final   • Total Bilirubin 03/29/2019 1.0  0.2 - 1.2 mg/dL Final   • Alkaline Phosphatase 03/29/2019 62  39 - 117 U/L Final   • AST (SGOT) 03/29/2019 12  1 - 32 U/L Final   • ALT (SGPT) 03/29/2019 16  1 - 33 U/L Final   • Free T4 03/29/2019 1.26  0.93 - 1.70 ng/dL Final   • TSH 03/29/2019 3.100  0.270 - 4.200 mIU/mL Final   • Thyroglobulin Ab 03/29/2019 301.6* 0.0 - 0.9 IU/mL Final    Thyroglobulin Antibody measured by [facility] Method[facility]   • Thyroglobulin (TG-ROYAL) 03/29/2019 12  ng/mL Final    Comment: Reference Range:  Pubertal Children  and Adults: <40  According to the National Academy of Clinical Biochemistry,  the reference interval for Thyroglobulin (TG) should be  related to euthyroid patients and not for patients who  underwent thyroidectomy.  TG reference intervals for these  patients depend on the residual mass of the thyroid tissue  left after surgery.  Establishing a post-operative baseline  is recommended.  The assay quantitation limit is 2.0 ng/mL.     • Thyroid Peroxidase Antibody 03/29/2019 85* 0 - 34 IU/mL Final         Review of Systems   Constitutional: Positive for fatigue.   HENT: Negative.    Eyes: Negative for visual disturbance.   Respiratory: Negative.    Cardiovascular: Negative.    Gastrointestinal: Negative for abdominal pain and constipation.   Endocrine: Positive for cold intolerance. Negative for polydipsia, polyphagia and polyuria.   Genitourinary: Negative.    Musculoskeletal: Negative for arthralgias.   Skin:        Dry skin, peeling skin    Neurological: Negative for dizziness and headaches.   Psychiatric/Behavioral: Negative for dysphoric mood and sleep disturbance. The patient is nervous/anxious.        Physical Exam   Constitutional: She is oriented to person, place, and time. Vital signs are normal. She appears well-developed and well-nourished.  She does not appear ill. No distress.   HENT:   Mouth/Throat: Mucous membranes are normal.   Cardiovascular: Normal rate, regular rhythm and normal heart sounds.   Pulmonary/Chest: Effort normal and breath sounds normal.   Neurological: She is alert and oriented to person, place, and time.   Skin: Skin is warm, dry and intact.   Acne noted on face  Dry peeling skin on scalp    Psychiatric: She has a normal mood and affect.       ASSESSMENT  Problem List Items Addressed This Visit     Anxiety - Primary      Other Visit Diagnoses     Hashimoto's disease        Relevant Medications    SYNTHROID 25 MCG tablet    Hyperglycemia        Morbid obesity due to excess calories (CMS/[facility])        Dry scalp              PLAN   1. Anxiety- will stay off fluoxetine for now , discussed starting wellbutrin but wants to wait  2. Elevated TSH- antibodies were elevated, TSH and free T4 were normal but due to her symptoms will start synthroid. Discussed anti-inflammatory diet, paleo diet, and avoid eggs and dairy  3. Hyperglycemia- her fasting glucose is 125. She has a history of gestational diabetes which was resolved after her pregnancies, discussed diet, exercise and weightloss, recommend walking 30-60 minutes a day  Will have her follow up in 3 months with lipid panel, a1c, cmp, tsh, and free t4   4. Will try selenium sulfide for dry scalp    Return in about 3 months (around 7/8/2019).

## 2019-07-02 DIAGNOSIS — R73.9 HYPERGLYCEMIA: ICD-10-CM

## 2019-07-02 DIAGNOSIS — E06.3 HASHIMOTO'S DISEASE: Primary | ICD-10-CM

## 2019-07-02 DIAGNOSIS — Z13.220 LIPID SCREENING: ICD-10-CM

## 2019-07-30 RX ORDER — LEVOTHYROXINE SODIUM 25 MCG
25 TABLET ORAL DAILY
Qty: 30 TABLET | Refills: 2 | Status: SHIPPED | OUTPATIENT
Start: 2019-07-30 | End: 2021-05-07

## 2020-04-01 ENCOUNTER — TELEMEDICINE (OUTPATIENT)
Dept: INTERNAL MEDICINE | Facility: CLINIC | Age: 35
End: 2020-04-01

## 2020-04-01 VITALS — RESPIRATION RATE: 16 BRPM

## 2020-04-01 DIAGNOSIS — F41.9 ANXIETY: Primary | ICD-10-CM

## 2020-04-01 PROBLEM — E66.01 MORBID OBESITY DUE TO EXCESS CALORIES: Status: ACTIVE | Noted: 2019-08-07

## 2020-04-01 PROBLEM — E06.3 HASHIMOTO'S DISEASE: Status: ACTIVE | Noted: 2020-04-01

## 2020-04-01 PROBLEM — K21.9 GERD WITHOUT ESOPHAGITIS: Status: ACTIVE | Noted: 2019-10-09

## 2020-04-01 PROCEDURE — 99213 OFFICE O/P EST LOW 20 MIN: CPT | Performed by: NURSE PRACTITIONER

## 2020-04-01 RX ORDER — DROSPIRENONE AND ETHINYL ESTRADIOL 0.03MG-3MG
1 KIT ORAL DAILY
COMMUNITY

## 2020-04-01 RX ORDER — LORAZEPAM 0.5 MG/1
0.5 TABLET ORAL 2 TIMES DAILY PRN
Qty: 30 TABLET | Refills: 0 | Status: SHIPPED | OUTPATIENT
Start: 2020-04-01

## 2020-04-01 NOTE — PATIENT INSTRUCTIONS
Living With Anxiety    After being diagnosed with an anxiety disorder, you may be relieved to know why you have felt or behaved a certain way. It is natural to also feel overwhelmed about the treatment ahead and what it will mean for your life. With care and support, you can manage this condition and recover from it.  How to cope with anxiety  Dealing with stress  Stress is your body’s reaction to life changes and events, both good and bad. Stress can last just a few hours or it can be ongoing. Stress can play a major role in anxiety, so it is important to learn both how to cope with stress and how to think about it differently.  Talk with your health care provider or a counselor to learn more about stress reduction. He or she may suggest some stress reduction techniques, such as:  · Music therapy. This can include creating or listening to music that you enjoy and that inspires you.  · Mindfulness-based meditation. This involves being aware of your normal breaths, rather than trying to control your breathing. It can be done while sitting or walking.  · Centering prayer. This is a kind of meditation that involves focusing on a word, phrase, or sacred image that is meaningful to you and that brings you peace.  · Deep breathing. To do this, expand your stomach and inhale slowly through your nose. Hold your breath for 3-5 seconds. Then exhale slowly, allowing your stomach muscles to relax.  · Self-talk. This is a skill where you identify thought patterns that lead to anxiety reactions and correct those thoughts.  · Muscle relaxation. This involves tensing muscles then relaxing them.  Choose a stress reduction technique that fits your lifestyle and personality. Stress reduction techniques take time and practice. Set aside 5-15 minutes a day to do them. Therapists can offer training in these techniques. The training may be covered by some insurance plans. Other things you can do to manage stress include:  · Keeping a  stress diary. This can help you learn what triggers your stress and ways to control your response.  · Thinking about how you respond to certain situations. You may not be able to control everything, but you can control your reaction.  · Making time for activities that help you relax, and not feeling guilty about spending your time in this way.  Therapy combined with coping and stress-reduction skills provides the best chance for successful treatment.  Medicines  Medicines can help ease symptoms. Medicines for anxiety include:  · Anti-anxiety drugs.  · Antidepressants.  · Beta-blockers.  Medicines may be used as the main treatment for anxiety disorder, along with therapy, or if other treatments are not working. Medicines should be prescribed by a health care provider.  Relationships  Relationships can play a big part in helping you recover. Try to spend more time connecting with trusted friends and family members. Consider going to couples counseling, taking family education classes, or going to family therapy. Therapy can help you and others better understand the condition.  How to recognize changes in your condition  Everyone has a different response to treatment for anxiety. Recovery from anxiety happens when symptoms decrease and stop interfering with your daily activities at home or work. This may mean that you will start to:  · Have better concentration and focus.  · Sleep better.  · Be less irritable.  · Have more energy.  · Have improved memory.  It is important to recognize when your condition is getting worse. Contact your health care provider if your symptoms interfere with home or work and you do not feel like your condition is improving.  Where to find help and support:  You can get help and support from these sources:  · Self-help groups.  · Online and community organizations.  · A trusted spiritual leader.  · Couples counseling.  · Family education classes.  · Family therapy.  Follow these instructions  at home:  · Eat a healthy diet that includes plenty of vegetables, fruits, whole grains, low-fat dairy products, and lean protein. Do not eat a lot of foods that are high in solid fats, added sugars, or salt.  · Exercise. Most adults should do the following:  ? Exercise for at least 150 minutes each week. The exercise should increase your heart rate and make you sweat (moderate-intensity exercise).  ? Strengthening exercises at least twice a week.  · Cut down on caffeine, tobacco, alcohol, and other potentially harmful substances.  · Get the right amount and quality of sleep. Most adults need 7-9 hours of sleep each night.  · Make choices that simplify your life.  · Take over-the-counter and prescription medicines only as told by your health care provider.  · Avoid caffeine, alcohol, and certain over-the-counter cold medicines. These may make you feel worse. Ask your pharmacist which medicines to avoid.  · Keep all follow-up visits as told by your health care provider. This is important.  Questions to ask your health care provider  · Would I benefit from therapy?  · How often should I follow up with a health care provider?  · How long do I need to take medicine?  · Are there any long-term side effects of my medicine?  · Are there any alternatives to taking medicine?  Contact a health care provider if:  · You have a hard time staying focused or finishing daily tasks.  · You spend many hours a day feeling worried about everyday life.  · You become exhausted by worry.  · You start to have headaches, feel tense, or have nausea.  · You urinate more than normal.  · You have diarrhea.  Get help right away if:  · You have a racing heart and shortness of breath.  · You have thoughts of hurting yourself or others.  If you ever feel like you may hurt yourself or others, or have thoughts about taking your own life, get help right away. You can go to your nearest emergency department or call:  · Your local emergency services  (911 in the [place]  · A suicide crisis helpline, such as the National Suicide Prevention Lifeline at 1-888.393.4392. This is open 24-hours a day.  Summary  · Taking steps to deal with stress can help calm you.  · Medicines cannot cure anxiety disorders, but they can help ease symptoms.  · Family, friends, and partners can play a big part in helping you recover from an anxiety disorder.  This information is not intended to replace advice given to you by your health care provider. Make sure you discuss any questions you have with your health care provider.  Document Released: 12/12/2017 Document Revised: 12/12/2017 Document Reviewed: 12/12/2017  Elsevier Interactive Patient Education © 2020 [employer]

## 2020-04-01 NOTE — PROGRESS NOTES
Subjective   Shu Manrique is a 34 y.o. female. Patient is here today for   Chief Complaint   Patient presents with   • Anxiety     for the last month          Vitals:    04/01/20 0934   Resp: 16     There is no height or weight on file to calculate BMI.  The following portions of the patient's history were reviewed and updated as appropriate: allergies, current medications, past family history, past medical history, past social history, past surgical history and problem list.    Past Medical History:   Diagnosis Date   • Anxiety       Allergies   Allergen Reactions   • Penicillins Rash      Social History     Socioeconomic History   • Marital status: Single     Spouse name: Not on file   • Number of children: Not on file   • Years of education: Not on file   • Highest education level: Not on file   Tobacco Use   • Smoking status: Never Smoker   • Smokeless tobacco: Never Used   Substance and Sexual Activity   • Alcohol use: Yes     Comment: socially    • Drug use: Defer   • Sexual activity: Defer        Current Outpatient Medications:   •  metFORMIN (GLUCOPHAGE) 500 MG tablet, Take 1 tablet by mouth 2 (Two) Times a Day., Disp: , Rfl:   •  SYNTHROID 25 MCG tablet, Take 1 tablet by mouth Daily. (Patient taking differently: Take 88 mcg by mouth Daily.), Disp: 30 tablet, Rfl: 2  •  drospirenone-ethinyl estradiol (BANDAR,OCELLA) 3-0.03 MG per tablet, Take 1 tablet/day by mouth Daily., Disp: , Rfl:   •  LORazepam (Ativan) 0.5 MG tablet, Take 1 tablet by mouth 2 (Two) Times a Day As Needed for Anxiety., Disp: 30 tablet, Rfl: 0  •  MULTIPLE MINERALS PO, Take 1 tablet/day by mouth Daily., Disp: , Rfl:   •  selenium sulfide (SELSUN) 2.5 % shampoo, Apply  topically to the appropriate area as directed Daily As Needed for dandruff., Disp: 118 mL, Rfl: 12  •  sertraline (Zoloft) 50 MG tablet, Take 1 tablet by mouth Daily., Disp: 30 tablet, Rfl: 3     Objective     Shu is attending a video visit to discuss anxiety. She has a  history of anxiety and has been off medication for almost a year.  Her anxiety had been well controlled until the last 3-4 weeks. She has had increased anxiety since the covid-19 pandemic due to increased stress and worry      Anxiety   Presents for initial visit. Onset was 1 to 4 weeks ago. The problem has been unchanged. Symptoms include decreased concentration, excessive worry, irritability, nervous/anxious behavior and panic. Patient reports no chest pain, depressed mood, palpitations, shortness of breath or suicidal ideas. Symptoms occur most days. The severity of symptoms is moderate. Exacerbated by: stress  The quality of sleep is fair. Nighttime awakenings: occasional.     Her past medical history is significant for anxiety/panic attacks. There is no history of depression. Past treatments include SSRIs, benzodiazephines and lifestyle changes (has been off medication in the last year ). The treatment provided significant relief. Compliance with prior treatments has been good.   she has hashimotos and has had labs recently with her endocrinologist.     Review of Systems   Constitutional: Positive for fatigue, irritability and unexpected weight change.   HENT: Negative.    Respiratory: Negative for chest tightness and shortness of breath.    Cardiovascular: Negative for chest pain and palpitations.   Gastrointestinal: Negative.    Neurological: Negative.    Psychiatric/Behavioral: Positive for decreased concentration and sleep disturbance (occasional ). Negative for dysphoric mood and suicidal ideas. The patient is nervous/anxious.        Physical Exam   Constitutional: She is oriented to person, place, and time. Vital signs are normal. She appears well-developed and well-nourished. No distress.   HENT:   Head: Normocephalic.   Mouth/Throat: Mucous membranes are normal.   Pulmonary/Chest: Effort normal.   Neurological: She is alert and oriented to person, place, and time.   Skin: Skin is warm and dry.    Psychiatric: She has a normal mood and affect.       ASSESSMENT     Problem List Items Addressed This Visit     Anxiety - Primary    Relevant Medications    LORazepam (Ativan) 0.5 MG tablet    sertraline (Zoloft) 50 MG tablet          PLAN  Will start zoloft 50mg daily. Discussed potential side effects and black box warnings  I gave her an rx for lorzepam 0.5 to take  As needed for anxiety/panic attacks- she has taken this in the past and it has worked  I will obtain a DAYNE but am unable to have her sign a CS contract due to this being a video visit.     Return in about 6 weeks (around 5/13/2020) for Recheck. recommend CPE after pandemic restraints have been lifted

## 2020-04-02 ENCOUNTER — TELEPHONE (OUTPATIENT)
Dept: INTERNAL MEDICINE | Facility: CLINIC | Age: 35
End: 2020-04-02

## 2020-04-02 RX ORDER — CYCLOBENZAPRINE HCL 10 MG
10 TABLET ORAL 3 TIMES DAILY PRN
Qty: 30 TABLET | Refills: 1 | Status: SHIPPED | OUTPATIENT
Start: 2020-04-02 | End: 2021-05-07 | Stop reason: SDUPTHER

## 2020-04-02 NOTE — TELEPHONE ENCOUNTER
Pt  Called and has had muscle spasms in her back and legs. She has a history of sciatica and tried a steroid a few weeks ago with little relief.,   Will send in an rx for flexeril.   Follow up if needed

## 2020-08-03 ENCOUNTER — TELEPHONE (OUTPATIENT)
Dept: INTERNAL MEDICINE | Facility: CLINIC | Age: 35
End: 2020-08-03

## 2020-08-03 DIAGNOSIS — F41.9 ANXIETY: ICD-10-CM

## 2021-04-16 ENCOUNTER — BULK ORDERING (OUTPATIENT)
Dept: CASE MANAGEMENT | Facility: OTHER | Age: 36
End: 2021-04-16

## 2021-04-16 DIAGNOSIS — Z23 IMMUNIZATION DUE: ICD-10-CM

## 2021-05-05 DIAGNOSIS — F41.9 ANXIETY: ICD-10-CM

## 2021-05-07 ENCOUNTER — TELEMEDICINE (OUTPATIENT)
Dept: INTERNAL MEDICINE | Facility: CLINIC | Age: 36
End: 2021-05-07

## 2021-05-07 DIAGNOSIS — G89.29 CHRONIC LOW BACK PAIN WITH SCIATICA, SCIATICA LATERALITY UNSPECIFIED, UNSPECIFIED BACK PAIN LATERALITY: ICD-10-CM

## 2021-05-07 DIAGNOSIS — F41.9 ANXIETY: Primary | ICD-10-CM

## 2021-05-07 DIAGNOSIS — M54.40 CHRONIC LOW BACK PAIN WITH SCIATICA, SCIATICA LATERALITY UNSPECIFIED, UNSPECIFIED BACK PAIN LATERALITY: ICD-10-CM

## 2021-05-07 PROCEDURE — 99213 OFFICE O/P EST LOW 20 MIN: CPT | Performed by: NURSE PRACTITIONER

## 2021-05-07 RX ORDER — LANCETS 33 GAUGE
EACH MISCELLANEOUS
COMMUNITY
Start: 2021-02-07

## 2021-05-07 RX ORDER — LEVOTHYROXINE SODIUM 112 MCG
TABLET ORAL
COMMUNITY
Start: 2021-04-20

## 2021-05-07 RX ORDER — BLOOD SUGAR DIAGNOSTIC
STRIP MISCELLANEOUS
COMMUNITY
Start: 2021-02-07

## 2021-05-07 RX ORDER — CYCLOBENZAPRINE HCL 10 MG
10 TABLET ORAL 3 TIMES DAILY PRN
Qty: 30 TABLET | Refills: 5 | Status: SHIPPED | OUTPATIENT
Start: 2021-05-07

## 2021-05-07 RX ORDER — ORAL SEMAGLUTIDE 14 MG/1
TABLET ORAL
COMMUNITY
Start: 2021-03-14

## 2021-05-07 NOTE — PROGRESS NOTES
Rob Manrique is a 35 y.o. female. Patient is here today for   Chief Complaint   Patient presents with   • Anxiety     med refill          There were no vitals filed for this visit.  There is no height or weight on file to calculate BMI.  The following portions of the patient's history were reviewed and updated as appropriate: allergies, current medications, past family history, past medical history, past social history, past surgical history and problem list.    Past Medical History:   Diagnosis Date   • Anxiety       Allergies   Allergen Reactions   • Penicillins Rash      Social History     Socioeconomic History   • Marital status:      Spouse name: Not on file   • Number of children: Not on file   • Years of education: Not on file   • Highest education level: Not on file   Tobacco Use   • Smoking status: Never Smoker   • Smokeless tobacco: Never Used   Substance and Sexual Activity   • Alcohol use: Yes     Comment: socially    • Drug use: Defer   • Sexual activity: Defer        Current Outpatient Medications:   •  cyclobenzaprine (FLEXERIL) 10 MG tablet, Take 1 tablet by mouth 3 (Three) Times a Day As Needed for Muscle Spasms., Disp: 30 tablet, Rfl: 5  •  drospirenone-ethinyl estradiol (BANDAR,OCELLA) 3-0.03 MG per tablet, Take 1 tablet/day by mouth Daily., Disp: , Rfl:   •  Lancets (OneTouch Delica Plus [id number]) misc, , Disp: , Rfl:   •  LORazepam (Ativan) 0.5 MG tablet, Take 1 tablet by mouth 2 (Two) Times a Day As Needed for Anxiety., Disp: 30 tablet, Rfl: 0  •  MULTIPLE MINERALS PO, Take 1 tablet/day by mouth Daily., Disp: , Rfl:   •  OneTouch Ultra test strip, , Disp: , Rfl:   •  Rybelsus 14 MG tablet, , Disp: , Rfl:   •  selenium sulfide (SELSUN) 2.5 % shampoo, Apply  topically to the appropriate area as directed Daily As Needed for dandruff., Disp: 118 mL, Rfl: 12  •  sertraline (Zoloft) 50 MG tablet, Take 1 tablet by mouth Daily., Disp: 90 tablet, Rfl: 0  •  Synthroid 112 MCG tablet,  , Disp: , Rfl:      Objective     History of Present Illness  Shu is a 35 year old female patient who is being seen today via telemedicine for medication refills. She has anxiety and takes zoloft daily which works well for her. She is compliant with her medication and is not experiencing any side effects.   She is seen every 3 months by her endocrinologist and gets labs regularly.   She has has occasional back pain and takes flexeril as needed.     Review of Systems   Constitutional: Negative.    Respiratory: Negative.    Cardiovascular: Negative.    Musculoskeletal: Positive for back pain (occasional, takes flexeril as needed ).   Psychiatric/Behavioral: The patient is nervous/anxious.        Physical Exam  Constitutional:       General: She is not in acute distress.     Appearance: Normal appearance.   Pulmonary:      Effort: Pulmonary effort is normal.   Neurological:      Mental Status: She is alert and oriented to person, place, and time.   Psychiatric:         Mood and Affect: Mood normal.         ASSESSMENT     Problems Addressed this Visit     Anxiety - Primary      Other Visit Diagnoses     Chronic low back pain with sciatica, sciatica laterality unspecified, unspecified back pain laterality          Diagnoses       Codes Comments    Anxiety    -  Primary ICD-10-CM: F41.9  ICD-9-CM: 300.00     Chronic low back pain with sciatica, sciatica laterality unspecified, unspecified back pain laterality     ICD-10-CM: M54.40, G89.29  ICD-9-CM: 724.2, 724.3, 338.29           PLAN  1. Anxiety is well controlled on sertraline, will continue   2. Low back pain- takes flexeril as needed for flares will refill  Follow up with endocrinology as scheduled    Return in about 1 year (around 5/7/2022). or sooner if needed   telehealth duration and discussion with patient 15 minutes

## 2021-08-11 ENCOUNTER — TELEPHONE (OUTPATIENT)
Dept: INTERNAL MEDICINE | Facility: CLINIC | Age: 36
End: 2021-08-11

## 2021-08-11 DIAGNOSIS — F41.9 ANXIETY: ICD-10-CM

## 2021-08-11 NOTE — TELEPHONE ENCOUNTER
Pt called and has had worsening anxiety and would like to increase sertraline. Will increase to 75mg daily , new rx sent to pharmacy, follow up if no improvement

## 2022-07-21 ENCOUNTER — DOCUMENTATION (OUTPATIENT)
Dept: INTERNAL MEDICINE | Facility: CLINIC | Age: 37
End: 2022-07-21

## 2022-07-21 RX ORDER — ESCITALOPRAM OXALATE 10 MG/1
10 TABLET ORAL DAILY
Qty: 90 TABLET | Refills: 3 | Status: SHIPPED | OUTPATIENT
Start: 2022-07-21

## 2022-07-22 ENCOUNTER — TELEPHONE (OUTPATIENT)
Dept: INTERNAL MEDICINE | Facility: CLINIC | Age: 37
End: 2022-07-22

## 2022-07-22 NOTE — TELEPHONE ENCOUNTER
Pt called - sertraline is no longer working for anxiety and she would like to switch to lexapro  Sertraline discontinued  lexapro 10mg sent to pharmacy  Follow up for CPE with labs - due in august

## 2023-06-05 ENCOUNTER — TELEMEDICINE (OUTPATIENT)
Dept: INTERNAL MEDICINE | Facility: CLINIC | Age: 38
End: 2023-06-05
Payer: COMMERCIAL

## 2023-06-05 DIAGNOSIS — F41.9 ANXIETY: Primary | ICD-10-CM

## 2023-06-05 PROCEDURE — 99214 OFFICE O/P EST MOD 30 MIN: CPT | Performed by: NURSE PRACTITIONER

## 2023-06-05 RX ORDER — ESCITALOPRAM OXALATE 10 MG/1
10 TABLET ORAL DAILY
Qty: 90 TABLET | Refills: 3 | Status: SHIPPED | OUTPATIENT
Start: 2023-06-05

## 2023-06-05 RX ORDER — BUPROPION HYDROCHLORIDE 150 MG/1
150 TABLET ORAL DAILY
Qty: 90 TABLET | Refills: 3 | Status: SHIPPED | OUTPATIENT
Start: 2023-06-05

## 2023-06-05 RX ORDER — SEMAGLUTIDE 1.7 MG/.75ML
INJECTION, SOLUTION SUBCUTANEOUS
COMMUNITY
Start: 2023-05-22

## 2023-06-05 RX ORDER — LEVOTHYROXINE SODIUM 0.12 MG/1
125 TABLET ORAL DAILY
COMMUNITY

## 2023-06-05 RX ORDER — MELOXICAM 7.5 MG/1
TABLET ORAL
COMMUNITY
Start: 2023-04-28

## 2023-06-05 RX ORDER — METFORMIN HYDROCHLORIDE 500 MG/1
TABLET, EXTENDED RELEASE ORAL
COMMUNITY
Start: 2023-03-20

## 2023-06-05 NOTE — PROGRESS NOTES
Subjective   Shu Manrique is a 37 y.o. female. Patient is here today for   Chief Complaint   Patient presents with    Anxiety     Mode of Visit: Video  Location of patient: home  [place] [facility]  You have chosen to receive care through a telehealth visit.  Does the patient consent to use a video/audio connection their medical care today? yes  The visit included audio and video interaction. No technical issues occurred during this visit.            There were no vitals filed for this visit.  There is no height or weight on file to calculate BMI.  The following portions of the patient's history were reviewed and updated as appropriate: allergies, current medications, past family history, past medical history, past social history, past surgical history and problem list.    Past Medical History:   Diagnosis Date    Anxiety       Allergies   Allergen Reactions    Penicillins Rash      Social History     Socioeconomic History    Marital status:    Tobacco Use    Smoking status: Never     Passive exposure: Never    Smokeless tobacco: Never   Vaping Use    Vaping Use: Never used   Substance and Sexual Activity    Alcohol use: Yes     Comment: socially     Drug use: Defer    Sexual activity: Defer     Birth control/protection: Birth control pill        Current Outpatient Medications:     buPROPion XL (Wellbutrin XL) 150 MG 24 hr tablet, Take 1 tablet by mouth Daily., Disp: 90 tablet, Rfl: 3    cyclobenzaprine (FLEXERIL) 10 MG tablet, Take 1 tablet by mouth 3 (Three) Times a Day As Needed for Muscle Spasms., Disp: 30 tablet, Rfl: 5    drospirenone-ethinyl estradiol (BANDAR,OCELLA) 3-0.03 MG per tablet, Take 1 tablet by mouth Daily., Disp: , Rfl:     escitalopram (Lexapro) 10 MG tablet, Take 1 tablet by mouth Daily., Disp: 90 tablet, Rfl: 3    Lancets (OneTouch Delica Plus [id number]) misc, , Disp: , Rfl:     levothyroxine (SYNTHROID, LEVOTHROID) 125 MCG tablet, Take 1 tablet by mouth Daily., Disp: ,  Rfl:     LORazepam (Ativan) 0.5 MG tablet, Take 1 tablet by mouth 2 (Two) Times a Day As Needed for Anxiety., Disp: 30 tablet, Rfl: 0    meloxicam (MOBIC) 7.5 MG tablet, , Disp: , Rfl:     metFORMIN ER (GLUCOPHAGE-XR) 500 MG 24 hr tablet, , Disp: , Rfl:     OneTouch Ultra test strip, , Disp: , Rfl:     selenium sulfide (SELSUN) 2.5 % shampoo, Apply  topically to the appropriate area as directed Daily As Needed for dandruff., Disp: 118 mL, Rfl: 12    Wegovy 1.7 MG/0.75ML solution auto-injector, , Disp: , Rfl:      Objective     History of Present Illness  Shu is a 37 year old female patient who is being seen via telemedicine for anxiety. She has noticed worsening anxiety over the last few months and worry. She has had difficulty focusing and with memory.   She has Prediabetes and Hypothyroidism and is followed by endocrinology. She has labs regularly     Review of Systems   Constitutional:  Positive for fatigue.   Psychiatric/Behavioral:  Positive for decreased concentration. Negative for dysphoric mood and sleep disturbance. The patient is nervous/anxious.      Physical Exam  Constitutional:       General: She is not in acute distress.     Appearance: Normal appearance.   Neurological:      Mental Status: She is alert and oriented to person, place, and time.   Psychiatric:         Mood and Affect: Mood normal.       Assessment    ASSESSMENT    Problems Addressed this Visit       Anxiety - Primary     Diagnoses         Codes Comments    Anxiety    -  Primary ICD-10-CM: F41.9  ICD-9-CM: 300.00             PLAN  Continue lexapro, will add wellbutrin  Discussed potential side effects and she will let me know if she cannot tolerate it  Recommend self care, plenty of rest, also she may consider therapy     Follow up as needed and for continual care

## 2024-07-12 RX ORDER — BUPROPION HYDROCHLORIDE 150 MG/1
150 TABLET ORAL DAILY
Qty: 90 TABLET | Refills: 0 | Status: SHIPPED | OUTPATIENT
Start: 2024-07-12

## 2024-07-23 ENCOUNTER — TELEMEDICINE (OUTPATIENT)
Dept: INTERNAL MEDICINE | Facility: CLINIC | Age: 39
End: 2024-07-23
Payer: COMMERCIAL

## 2024-07-23 DIAGNOSIS — F41.9 ANXIETY: Primary | ICD-10-CM

## 2024-07-23 DIAGNOSIS — F41.9 MILD ANXIETY: ICD-10-CM

## 2024-07-23 PROCEDURE — 99214 OFFICE O/P EST MOD 30 MIN: CPT | Performed by: NURSE PRACTITIONER

## 2024-07-23 RX ORDER — NIRMATRELVIR AND RITONAVIR 300-100 MG
3 KIT ORAL 2 TIMES DAILY
COMMUNITY
Start: 2024-07-22

## 2024-07-23 RX ORDER — ESCITALOPRAM OXALATE 20 MG/1
20 TABLET ORAL DAILY
Qty: 90 TABLET | Refills: 1 | Status: SHIPPED | OUTPATIENT
Start: 2024-07-23

## 2024-07-23 RX ORDER — METFORMIN HYDROCHLORIDE 500 MG/1
1 TABLET, EXTENDED RELEASE ORAL DAILY
COMMUNITY
Start: 2024-03-26

## 2024-07-23 RX ORDER — TIRZEPATIDE 15 MG/.5ML
15 INJECTION, SOLUTION SUBCUTANEOUS WEEKLY
COMMUNITY
Start: 2024-06-22

## 2024-07-23 RX ORDER — LEVOTHYROXINE SODIUM 137 MCG
1 TABLET ORAL DAILY
COMMUNITY
Start: 2024-03-26

## 2024-07-23 NOTE — PROGRESS NOTES
Subjective   Shu Manrique is a 38 y.o. female. Patient is here today for   Chief Complaint   Patient presents with    Anxiety     Mode of Visit: Video  Location of patient: home  [place] [facility]  You have chosen to receive care through a telehealth visit.  Does the patient consent to use a video/audio connection their medical care today? yes  The visit included audio and video interaction. No technical issues occurred during this visit.          There were no vitals filed for this visit.  There is no height or weight on file to calculate BMI.  The following portions of the patient's history were reviewed and updated as appropriate: allergies, current medications, past family history, past medical history, past social history, past surgical history and problem list.    Past Medical History:   Diagnosis Date    Anxiety       Allergies   Allergen Reactions    Penicillins Rash      Social History     Socioeconomic History    Marital status:    Tobacco Use    Smoking status: Never     Passive exposure: Never    Smokeless tobacco: Never   Vaping Use    Vaping status: Never Used   Substance and Sexual Activity    Alcohol use: Yes     Comment: socially     Drug use: Defer    Sexual activity: Defer     Birth control/protection: Birth control pill        Current Outpatient Medications:     escitalopram (Lexapro) 20 MG tablet, Take 1 tablet by mouth Daily., Disp: 90 tablet, Rfl: 1    metFORMIN ER (GLUCOPHAGE-XR) 500 MG 24 hr tablet, Take 1 tablet by mouth Daily., Disp: , Rfl:     Mounjaro 15 MG/0.5ML solution pen-injector pen, Inject 0.5 mL under the skin into the appropriate area as directed 1 (One) Time Per Week., Disp: , Rfl:     nystatin-triamcinolone (MYCOLOG II) 094607-2.1 UNIT/GM-% cream, , Disp: , Rfl:     Paxlovid, 300/100,, Take 3 tablets by mouth 2 (Two) Times a Day., Disp: , Rfl:     Synthroid 137 MCG tablet, Take 1 tablet by mouth Daily., Disp: , Rfl:     buPROPion XL (Wellbutrin XL) 150 MG  24 hr tablet, Take 1 tablet by mouth Daily., Disp: 90 tablet, Rfl: 0    cyclobenzaprine (FLEXERIL) 10 MG tablet, Take 1 tablet by mouth 3 (Three) Times a Day As Needed for Muscle Spasms., Disp: 30 tablet, Rfl: 5    drospirenone-ethinyl estradiol (BANDAR,OCELLA) 3-0.03 MG per tablet, Take 1 tablet by mouth Daily., Disp: , Rfl:     levothyroxine (SYNTHROID, LEVOTHROID) 125 MCG tablet, Take 1 tablet by mouth Daily., Disp: , Rfl:     LORazepam (Ativan) 0.5 MG tablet, Take 1 tablet by mouth 2 (Two) Times a Day As Needed for Anxiety., Disp: 30 tablet, Rfl: 0    meloxicam (MOBIC) 7.5 MG tablet, , Disp: , Rfl:      Objective     History of Present Illness  Shu is a 38 year old female patient who is being seen via telemedicine for a follow up for anxiety. She has covid and is currently quarantining. She was last seen a year ago. She has hypothyroidism and is followed by endocrinology.she gets regular labs with them.   She has been on wellbutrin and lexapro and is still having some anxiety and depression. She is seeing a therapist         Review of Systems   Constitutional:  Positive for fatigue.   HENT:          Recently dx with covid and is on paxlovid    Respiratory:  Positive for cough.    Cardiovascular:  Negative for chest pain.   Genitourinary: Negative.    Musculoskeletal: Negative.    Psychiatric/Behavioral:  Positive for dysphoric mood. Negative for self-injury, sleep disturbance and suicidal ideas. The patient is nervous/anxious.        Physical Exam  Constitutional:       General: She is not in acute distress.  Neurological:      Mental Status: She is alert.   Psychiatric:         Attention and Perception: Attention normal.         Mood and Affect: Mood normal.         Assessment    ASSESSMENT    Problems Addressed this Visit       Anxiety - Primary     Other Visit Diagnoses       Mild anxiety              Diagnoses         Codes Comments    Anxiety    -  Primary ICD-10-CM: F41.9  ICD-9-CM: 300.00     Mild  anxiety     ICD-10-CM: F41.9  ICD-9-CM: 300.00             PLAN  She scored a 7 on the PHQ9 and a 13 on the JYOTHI 7. Will continue wellbutrin 150mg and increase lexapro to 20mg daily. She will continue to see her therapist, recommend self care  She will let me know how she is doing in the next 8 weeks  Recommend she schedule a CPE with labs   She is followed by endocrine and GYN  Mammogram is scheduled   She is due for TDAP

## 2024-08-27 ENCOUNTER — PATIENT MESSAGE (OUTPATIENT)
Dept: INTERNAL MEDICINE | Facility: CLINIC | Age: 39
End: 2024-08-27
Payer: COMMERCIAL

## 2024-08-29 ENCOUNTER — TELEPHONE (OUTPATIENT)
Dept: INTERNAL MEDICINE | Facility: CLINIC | Age: 39
End: 2024-08-29
Payer: COMMERCIAL

## 2024-08-29 NOTE — TELEPHONE ENCOUNTER
----- Message from Rogelio SALTER sent at 8/28/2024  7:29 AM EDT -----  Regarding: FW: Deandre Duarte  Contact: [phone]  If ok, I will order through [employer] site.

## 2024-10-22 RX ORDER — BUPROPION HYDROCHLORIDE 150 MG/1
150 TABLET ORAL DAILY
Qty: 90 TABLET | Refills: 0 | Status: SHIPPED | OUTPATIENT
Start: 2024-10-22

## 2024-11-23 ENCOUNTER — DOCUMENTATION (OUTPATIENT)
Dept: INTERNAL MEDICINE | Facility: CLINIC | Age: 39
End: 2024-11-23
Payer: COMMERCIAL

## 2024-11-23 RX ORDER — CYCLOBENZAPRINE HCL 10 MG
10 TABLET ORAL 3 TIMES DAILY PRN
Qty: 30 TABLET | Refills: 5 | Status: SHIPPED | OUTPATIENT
Start: 2024-11-23

## 2025-01-03 ENCOUNTER — APPOINTMENT (OUTPATIENT)
Dept: WOMENS IMAGING | Facility: HOSPITAL | Age: 40
End: 2025-01-03
Payer: COMMERCIAL

## 2025-01-03 PROCEDURE — G0279 TOMOSYNTHESIS, MAMMO: HCPCS | Performed by: RADIOLOGY

## 2025-01-03 PROCEDURE — 77065 DX MAMMO INCL CAD UNI: CPT | Performed by: RADIOLOGY

## 2025-01-03 PROCEDURE — 76642 ULTRASOUND BREAST LIMITED: CPT | Performed by: RADIOLOGY

## 2025-01-03 PROCEDURE — 77061 BREAST TOMOSYNTHESIS UNI: CPT | Performed by: RADIOLOGY

## 2025-01-09 ENCOUNTER — LAB REQUISITION (OUTPATIENT)
Dept: LAB | Facility: HOSPITAL | Age: 40
End: 2025-01-09
Payer: COMMERCIAL

## 2025-01-09 ENCOUNTER — APPOINTMENT (OUTPATIENT)
Dept: WOMENS IMAGING | Facility: HOSPITAL | Age: 40
End: 2025-01-09
Payer: COMMERCIAL

## 2025-01-09 DIAGNOSIS — N63.0 UNSPECIFIED LUMP IN UNSPECIFIED BREAST: ICD-10-CM

## 2025-01-09 PROCEDURE — 88305 TISSUE EXAM BY PATHOLOGIST: CPT | Performed by: OBSTETRICS & GYNECOLOGY

## 2025-01-09 PROCEDURE — 19083 BX BREAST 1ST LESION US IMAG: CPT | Performed by: RADIOLOGY

## 2025-01-09 PROCEDURE — A4648 IMPLANTABLE TISSUE MARKER: HCPCS | Performed by: RADIOLOGY

## 2025-01-10 LAB
CYTO UR: NORMAL
DX PRELIMINARY: NORMAL
LAB AP CASE REPORT: NORMAL
LAB AP CLINICAL INFORMATION: NORMAL
PATH REPORT.FINAL DX SPEC: NORMAL
PATH REPORT.GROSS SPEC: NORMAL